# Patient Record
(demographics unavailable — no encounter records)

---

## 2017-04-29 NOTE — NUR
Patient to be transferred to [facility].  Is being transferred due to FOR HIGHER 
LEVEL OF CARE TIA VS CVA .  Receiving facility has accepting physician and 
available space. ER physician has signed transfer form.  Patient or responsible 
party has agreed to transfer and signed form.  Patient belongings inventoried 
and will be sent with patient.  Copy of nursing notes, lab reports, EKG, 
Physicians Orders and X-rays to be sent with patient.  Report called to JUNG RUCKER at receiving facility.   ambulance service has been called for transfer. 
 ETA is 10 MINUTES.

## 2017-04-29 NOTE — NUR
ATTEMPTED TO GIVE REPORT JUNG COVARRUBIAS NOT READY AT THIS TIME TALKING TO THE MD WILL 
CALL BACK LATER

## 2017-04-29 NOTE — NUR
AAO PT EATING LUNCH BROUGHT BY SON AT BEDSIDE, NO ACUTE DISTRESS NOTED, NO C/O 
SOB, OR PAIN AT THIS TIME

## 2017-04-29 NOTE — NUR
PATIENT PRESENTS TO ED WITH C/O LEFT SIDED WEAKNESS AT ABOUT 0945AM, ---

ABLE TO MOVE ALL EXTREMITIES, LIFT LEFT HAND AND LEFT LEG WITH MILD WEAKNESS VS 
R EXTREMITIES---FULL CLEAR SPEECH,NO FACIAL ASYMMETRY NOTED

DENIES DIZINESS, DENIES N/V/D, DENIES HEAD ACHE; HX DM; RX PRE DM MEDICATION; 
SKIN IS PINK/WARM/DRY; AAOX4 WITH EVEN AND STEADY GAIT; LUNGS CLEAR BL; HR EVEN 
AND REGULAR; PT DENIES ANY FEVER, CP, SOB, OR COUGH AT THIS TIME; PATIENT 
STATES PAIN OF 0/10 AT THIS TIME; VSS; PATIENT POSITIONED FOR COMFORT; HOB 
ELEVATED; BEDRAILS UP X2; BED DOWN. ER MD MADE AWARE OF PT STATUS.